# Patient Record
Sex: FEMALE | Race: BLACK OR AFRICAN AMERICAN | NOT HISPANIC OR LATINO
[De-identification: names, ages, dates, MRNs, and addresses within clinical notes are randomized per-mention and may not be internally consistent; named-entity substitution may affect disease eponyms.]

---

## 2019-09-06 ENCOUNTER — APPOINTMENT (OUTPATIENT)
Dept: ORTHOPEDIC SURGERY | Facility: CLINIC | Age: 64
End: 2019-09-06

## 2019-09-06 PROBLEM — Z00.00 ENCOUNTER FOR PREVENTIVE HEALTH EXAMINATION: Status: ACTIVE | Noted: 2019-09-06

## 2019-09-30 ENCOUNTER — APPOINTMENT (OUTPATIENT)
Dept: ORTHOPEDIC SURGERY | Facility: CLINIC | Age: 64
End: 2019-09-30
Payer: MEDICARE

## 2019-09-30 PROCEDURE — 99204 OFFICE O/P NEW MOD 45 MIN: CPT | Mod: 25

## 2019-09-30 PROCEDURE — 20611 DRAIN/INJ JOINT/BURSA W/US: CPT | Mod: 50

## 2019-09-30 PROCEDURE — 73562 X-RAY EXAM OF KNEE 3: CPT | Mod: 50

## 2019-09-30 RX ORDER — HYALURONATE SODIUM 30 MG/2 ML
30 SYRINGE (ML) INTRAARTICULAR
Qty: 6 | Refills: 0 | Status: ACTIVE | OUTPATIENT
Start: 2019-09-30

## 2019-09-30 NOTE — DISCUSSION/SUMMARY
[de-identified] : Patient may require knee replacement surgery in the future however our next the consider Orthovisc injections. Patient will contact us to relate the fact that the cortisone injection and possible order an Orthovisc.

## 2019-09-30 NOTE — PHYSICAL EXAM
[de-identified] : Left knee exam definite lateral patella facet pain with compression. Range of motion 0-125° there is 1+ soft tissue swelling about the knee but no effusion neurovascular intact distally.\par \par Right knee exam essentially similar findings to a lesser degree in terms of discomfort. Lateral patella facet pain compression range of motion 0-130° mild soft tissue swelling no effusion neurovascular intact distally. [de-identified] : AP standing and lateral and sunrise views were obtained showing well preserved tibiofemoral articulations. There is significant narrowing of the lateral patella facet on bilateral sunrise views.

## 2019-09-30 NOTE — PROCEDURE
[de-identified] : She was given cortisone injections into the lateral compartment of each knee today. This is sterile conditions an ultrasound guidance. The patient tolerated the procedure well.

## 2019-09-30 NOTE — HISTORY OF PRESENT ILLNESS
[de-identified] : DAYANA KNEE PAIN - SENT FOR NEW XRAYS Patient developing bilateral knee pain especially with stair climbing. She is satisfied with both hip replacements